# Patient Record
Sex: FEMALE | Employment: FULL TIME | URBAN - METROPOLITAN AREA
[De-identification: names, ages, dates, MRNs, and addresses within clinical notes are randomized per-mention and may not be internally consistent; named-entity substitution may affect disease eponyms.]

---

## 2021-10-04 ENCOUNTER — HOSPITAL ENCOUNTER (EMERGENCY)
Facility: HOSPITAL | Age: 21
Discharge: HOME | End: 2021-10-04
Attending: FAMILY MEDICINE | Admitting: FAMILY MEDICINE
Payer: COMMERCIAL

## 2021-10-04 ENCOUNTER — APPOINTMENT (OUTPATIENT)
Dept: MRI IMAGING | Facility: HOSPITAL | Age: 21
End: 2021-10-04
Attending: FAMILY MEDICINE
Payer: COMMERCIAL

## 2021-10-04 VITALS
TEMPERATURE: 99.1 F | OXYGEN SATURATION: 97 % | WEIGHT: 195 LBS | RESPIRATION RATE: 16 BRPM | DIASTOLIC BLOOD PRESSURE: 67 MMHG | HEIGHT: 59 IN | HEART RATE: 70 BPM | BODY MASS INDEX: 39.31 KG/M2 | SYSTOLIC BLOOD PRESSURE: 121 MMHG

## 2021-10-04 DIAGNOSIS — R51.9 OCCIPITAL HEADACHE: ICD-10-CM

## 2021-10-04 DIAGNOSIS — R43.2 LOSS OF TASTE: ICD-10-CM

## 2021-10-04 LAB
ANION GAP SERPL CALCULATED.3IONS-SCNC: 9 MMOL/L (ref 5–18)
BUN SERPL-MCNC: 7 MG/DL (ref 8–22)
CALCIUM SERPL-MCNC: 9.1 MG/DL (ref 8.5–10.5)
CHLORIDE BLD-SCNC: 105 MMOL/L (ref 98–107)
CO2 SERPL-SCNC: 23 MMOL/L (ref 22–31)
CREAT SERPL-MCNC: 0.7 MG/DL (ref 0.6–1.1)
GFR SERPL CREATININE-BSD FRML MDRD: >90 ML/MIN/1.73M2
GLUCOSE BLD-MCNC: 84 MG/DL (ref 70–125)
HCG SERPL-ACNC: <2 MLU/ML (ref 0–4)
POTASSIUM BLD-SCNC: 4.2 MMOL/L (ref 3.5–5)
SODIUM SERPL-SCNC: 137 MMOL/L (ref 136–145)
TSH SERPL DL<=0.005 MIU/L-ACNC: 0.98 UIU/ML (ref 0.3–5)

## 2021-10-04 PROCEDURE — 99285 EMERGENCY DEPT VISIT HI MDM: CPT | Mod: 25

## 2021-10-04 PROCEDURE — 84702 CHORIONIC GONADOTROPIN TEST: CPT | Performed by: FAMILY MEDICINE

## 2021-10-04 PROCEDURE — 96375 TX/PRO/DX INJ NEW DRUG ADDON: CPT

## 2021-10-04 PROCEDURE — 70553 MRI BRAIN STEM W/O & W/DYE: CPT

## 2021-10-04 PROCEDURE — 82374 ASSAY BLOOD CARBON DIOXIDE: CPT | Performed by: FAMILY MEDICINE

## 2021-10-04 PROCEDURE — 96374 THER/PROPH/DIAG INJ IV PUSH: CPT | Mod: 59

## 2021-10-04 PROCEDURE — A9585 GADOBUTROL INJECTION: HCPCS | Performed by: FAMILY MEDICINE

## 2021-10-04 PROCEDURE — 250N000011 HC RX IP 250 OP 636: Performed by: FAMILY MEDICINE

## 2021-10-04 PROCEDURE — 36415 COLL VENOUS BLD VENIPUNCTURE: CPT | Performed by: FAMILY MEDICINE

## 2021-10-04 PROCEDURE — 84443 ASSAY THYROID STIM HORMONE: CPT | Performed by: FAMILY MEDICINE

## 2021-10-04 PROCEDURE — 255N000002 HC RX 255 OP 636: Performed by: FAMILY MEDICINE

## 2021-10-04 RX ORDER — DEXAMETHASONE SODIUM PHOSPHATE 10 MG/ML
10 INJECTION, SOLUTION INTRAMUSCULAR; INTRAVENOUS ONCE
Status: COMPLETED | OUTPATIENT
Start: 2021-10-04 | End: 2021-10-04

## 2021-10-04 RX ORDER — KETOROLAC TROMETHAMINE 15 MG/ML
15 INJECTION, SOLUTION INTRAMUSCULAR; INTRAVENOUS ONCE
Status: COMPLETED | OUTPATIENT
Start: 2021-10-04 | End: 2021-10-04

## 2021-10-04 RX ORDER — GADOBUTROL 604.72 MG/ML
9 INJECTION INTRAVENOUS ONCE
Status: COMPLETED | OUTPATIENT
Start: 2021-10-04 | End: 2021-10-04

## 2021-10-04 RX ORDER — ONDANSETRON 2 MG/ML
4 INJECTION INTRAMUSCULAR; INTRAVENOUS ONCE
Status: COMPLETED | OUTPATIENT
Start: 2021-10-04 | End: 2021-10-04

## 2021-10-04 RX ADMIN — ONDANSETRON 4 MG: 2 INJECTION INTRAMUSCULAR; INTRAVENOUS at 16:57

## 2021-10-04 RX ADMIN — DEXAMETHASONE SODIUM PHOSPHATE 10 MG: 10 INJECTION, SOLUTION INTRAMUSCULAR; INTRAVENOUS at 17:02

## 2021-10-04 RX ADMIN — KETOROLAC TROMETHAMINE 15 MG: 15 INJECTION, SOLUTION INTRAMUSCULAR; INTRAVENOUS at 17:01

## 2021-10-04 RX ADMIN — GADOBUTROL 9 ML: 604.72 INJECTION INTRAVENOUS at 18:23

## 2021-10-04 ASSESSMENT — MIFFLIN-ST. JEOR: SCORE: 1545.14

## 2021-10-04 NOTE — ED TRIAGE NOTES
Pt having headaches more on left side of head for 2-3 weeks but worse since 1 week ago.  Also feeling nauseous.  Had negative home UPT and negative Covid x 2 in the past 2 weeks

## 2021-10-04 NOTE — ED NOTES
MRI checklist complete and faxed to MRI; pt is in ED lobby so will have to undress when taken to MRI.

## 2021-10-04 NOTE — Clinical Note
Marie Wyatt was seen and treated in our emergency department on 10/4/2021.  She may return to work on 10/05/2021.       If you have any questions or concerns, please don't hesitate to call.      James Parks MD

## 2021-10-04 NOTE — ED PROVIDER NOTES
EMERGENCY DEPARTMENT ENCOUNTER      NAME: Marie Wyatt  AGE: 21 year old female  YOB: 2000  MRN: [id number]  EVALUATION DATE & TIME: No admission date for patient encounter.    PCP: No primary care provider on file.    ED PROVIDER: James Parks M.D.    Chief Complaint   Patient presents with     Headache     Nausea       FINAL IMPRESSION:  1. Occipital headache    2. Loss of taste        ED COURSE & MEDICAL DECISION MAKING:    Pertinent Labs & Imaging studies personally reviewed and interpreted by me. (See chart for details)  1:19 PM Patient seen and examined, prior records reviewed.  Patient seen in triage, differential diagnosis includes but not limited to recurrent headache, migraine headache, tension headache, cluster headache, intracranial hemorrhage, intracranial mass, meningitis, encephalitis, rebound headache.  Patient with left occipital headache with some left-sided neck pain, worse laying down and also with change in taste sensation.  No focal neurologic deficits otherwise.  6:55 PM MRI is negative for acute findings, patient is stable for discharge.       At the conclusion of the encounter I discussed the results of all of the tests and the disposition. The questions were answered. The patient or family acknowledged understanding and was agreeable with the care plan.     PROCEDURES:   Procedures    MEDICATIONS GIVEN IN THE EMERGENCY:  Medications   ketorolac (TORADOL) injection 15 mg (15 mg Intravenous Given 10/4/21 1701)   dexamethasone PF (DECADRON) injection 10 mg (10 mg Intravenous Given 10/4/21 1702)   ondansetron (ZOFRAN) injection 4 mg (4 mg Intravenous Given 10/4/21 1657)   gadobutrol (GADAVIST) injection 9 mL (9 mLs Intravenous Given 10/4/21 1823)       NEW PRESCRIPTIONS STARTED AT TODAY'S ER VISIT  New Prescriptions    No medications on file       =================================================================    HPI    Patient information was obtained from: Patient       Marie Wyatt is a 21 year old female with a pertinent history of childhood headaches who presents to this ED by walk in for evaluation of headache and dizziness.    The patient presents complaining of nausea, light-headedness, headache, dizziness, and decreased taste for the past 2-3 weeks. Her headaches is located on the left side of her head and extends to the back of her head. The headache feels like it might be a migraine. Her headache causes her to become dizzy while lying down. She was concerned that these symptoms may be indicative of COVID so she took 2 COVID tests, both of which returned negative. She was recently in [place] for vacation. While in [place] she reports become nauseous whenever she would taste or smell meat. She thought this might be indicative that she was pregnant, so she took a pregnancy test that was negative. While she was in [place] the pain from her headache worsened. She tried to take Tylenol to treat her headache, but the headache would only calm down not go away. When she returned from [place] her ears popped and have not returned to normal yet. She reports a history of severe headaches as a child, but states her last sever headache was in her early teenage years. She has not been vaccinated for COVID. She denies any blurry vision, double vision, fever, cough, abdominal pain, shortness of breath, extremity numbness, or any other complaints at this time.    REVIEW OF SYSTEMS   Review of Systems   All other systems reviewed and negative    PAST MEDICAL HISTORY:  History reviewed. No pertinent past medical history.    PAST SURGICAL HISTORY:  History reviewed. No pertinent surgical history.    CURRENT MEDICATIONS:    No current facility-administered medications for this encounter.     Current Outpatient Medications   Medication     phenazopyridine (PYRIDIUM) 200 MG tablet       ALLERGIES:  No Known Allergies    FAMILY HISTORY:  History reviewed. No pertinent family history.    SOCIAL  "HISTORY:   Social History     Socioeconomic History     Marital status: Single     Spouse name: Not on file     Number of children: Not on file     Years of education: Not on file     Highest education level: Not on file   Occupational History     Not on file   Tobacco Use     Smoking status: Not on file   Substance and Sexual Activity     Alcohol use: Not on file     Drug use: Not on file     Sexual activity: Not on file   Other Topics Concern     Not on file   Social History Narrative     Not on file     Social Determinants of Health     Financial Resource Strain:      Difficulty of Paying Living Expenses:    Food Insecurity:      Worried About Running Out of Food in the Last Year:      Ran Out of Food in the Last Year:    Transportation Needs:      Lack of Transportation (Medical):      Lack of Transportation (Non-Medical):    Physical Activity:      Days of Exercise per Week:      Minutes of Exercise per Session:    Stress:      Feeling of Stress :    Social Connections:      Frequency of Communication with Friends and Family:      Frequency of Social Gatherings with Friends and Family:      Attends [employer] Services:      Active Member of Clubs or Organizations:      Attends Club or Organization Meetings:      Marital Status:    Intimate Partner Violence:      Fear of Current or Ex-Partner:      Emotionally Abused:      Physically Abused:      Sexually Abused:        VITALS:  /71   Pulse 58   Temp 99.1  F (37.3  C) (Oral)   Resp 16   Ht 1.499 m (4' 11\")   Wt 88.5 kg (195 lb)   LMP 09/15/2021   SpO2 99%   BMI 39.39 kg/m      PHYSICAL EXAM:  Physical Exam  Vitals and nursing note reviewed.   Constitutional:       Appearance: Normal appearance.   HENT:      Head: Normocephalic and atraumatic.      Right Ear: External ear normal.      Left Ear: External ear normal.      Nose: Nose normal.      Mouth/Throat:      Mouth: Mucous membranes are moist.   Eyes:      Extraocular Movements: Extraocular " movements intact.      Conjunctiva/sclera: Conjunctivae normal.      Pupils: Pupils are equal, round, and reactive to light.   Cardiovascular:      Rate and Rhythm: Normal rate and regular rhythm.   Pulmonary:      Effort: Pulmonary effort is normal.      Breath sounds: Normal breath sounds. No wheezing or rales.   Abdominal:      General: Abdomen is flat. There is no distension.      Palpations: Abdomen is soft.      Tenderness: There is no abdominal tenderness. There is no guarding.   Musculoskeletal:         General: Normal range of motion.      Cervical back: Normal range of motion and neck supple.      Right lower leg: No edema.      Left lower leg: No edema.   Lymphadenopathy:      Cervical: No cervical adenopathy.   Skin:     General: Skin is warm and dry.   Neurological:      General: No focal deficit present.      Mental Status: She is alert and oriented to person, place, and time. Mental status is at baseline.      Comments: No gross focal neurologic deficits   Psychiatric:         Mood and Affect: Mood normal.         Behavior: Behavior normal.         Thought Content: Thought content normal.          LAB:  All pertinent labs reviewed and interpreted.  Results for orders placed or performed during the hospital encounter of 10/04/21   MR Brain COW Carotid wwo Contrast    Impression    CONCLUSION:  HEAD MRI:  1.  No acute intracranial finding. No evidence for recent ischemia, intracranial hemorrhage, or mass.    HEAD MRA:  1.  Negative MR angiogram of the brain. No evidence for aneurysm, proximal vessel occlusion, high-grade intracranial stenosis or high flow vascular lesion.    NECK MRA:  1.  No evidence for dissection or hemodynamically significant narrowing in the neck based on NASCET criteria.   Basic metabolic panel   Result Value Ref Range    Sodium 137 136 - 145 mmol/L    Potassium 4.2 3.5 - 5.0 mmol/L    Chloride 105 98 - 107 mmol/L    Carbon Dioxide (CO2) 23 22 - 31 mmol/L    Anion Gap 9 5 - 18  mmol/L    Urea Nitrogen 7 (L) 8 - 22 mg/dL    Creatinine 0.70 0.60 - 1.10 mg/dL    Calcium 9.1 8.5 - 10.5 mg/dL    Glucose 84 70 - 125 mg/dL    GFR Estimate >90 >60 mL/min/1.73m2   TSH with free T4 reflex   Result Value Ref Range    TSH 0.98 0.30 - 5.00 uIU/mL   HCG quantitative pregnancy   Result Value Ref Range    hCG Quantitative <2 0 - 4 mlU/mL       RADIOLOGY:  Reviewed all pertinent imaging. Please see official radiology report.  MR Brain COW Carotid wwo Contrast   Final Result   CONCLUSION:   HEAD MRI:   1.  No acute intracranial finding. No evidence for recent ischemia, intracranial hemorrhage, or mass.      HEAD MRA:   1.  Negative MR angiogram of the brain. No evidence for aneurysm, proximal vessel occlusion, high-grade intracranial stenosis or high flow vascular lesion.      NECK MRA:   1.  No evidence for dissection or hemodynamically significant narrowing in the neck based on NASCET criteria.        I, Gustavo Medina, am serving as a scribe to document services personally performed by Dr. Parks based on my observation and the provider's statements to me. I, James Parks MD attest that Gustavo Medina is acting in a scribe capacity, has observed my performance of the services and has documented them in accordance with my direction.    James Parks M.D.  Emergency Medicine  [facility] EMERGENCY DEPARTMENT  [address] [facility] [facility] [address]  [phone]  Dept: [phone]     James Parks MD  10/04/21 1850